# Patient Record
Sex: FEMALE | Race: WHITE | NOT HISPANIC OR LATINO | Employment: OTHER | RURAL
[De-identification: names, ages, dates, MRNs, and addresses within clinical notes are randomized per-mention and may not be internally consistent; named-entity substitution may affect disease eponyms.]

---

## 2020-07-24 ENCOUNTER — HISTORICAL (OUTPATIENT)
Dept: ADMINISTRATIVE | Facility: HOSPITAL | Age: 45
End: 2020-07-24

## 2020-07-24 LAB — SARS-COV+SARS-COV-2 AG RESP QL IA.RAPID: NEGATIVE

## 2022-09-08 ENCOUNTER — OFFICE VISIT (OUTPATIENT)
Dept: NEUROLOGY | Facility: CLINIC | Age: 47
End: 2022-09-08
Payer: MEDICARE

## 2022-09-08 VITALS
OXYGEN SATURATION: 99 % | HEART RATE: 69 BPM | HEIGHT: 60 IN | SYSTOLIC BLOOD PRESSURE: 134 MMHG | BODY MASS INDEX: 23.66 KG/M2 | WEIGHT: 120.5 LBS | DIASTOLIC BLOOD PRESSURE: 78 MMHG

## 2022-09-08 DIAGNOSIS — G43.909 MIGRAINE WITHOUT STATUS MIGRAINOSUS, NOT INTRACTABLE, UNSPECIFIED MIGRAINE TYPE: Primary | ICD-10-CM

## 2022-09-08 DIAGNOSIS — F43.21 GRIEF AT LOSS OF CHILD: ICD-10-CM

## 2022-09-08 DIAGNOSIS — Z63.4 GRIEF AT LOSS OF CHILD: ICD-10-CM

## 2022-09-08 PROCEDURE — 99204 PR OFFICE/OUTPT VISIT, NEW, LEVL IV, 45-59 MIN: ICD-10-PCS | Mod: S$PBB,,, | Performed by: PSYCHIATRY & NEUROLOGY

## 2022-09-08 PROCEDURE — 99204 OFFICE O/P NEW MOD 45 MIN: CPT | Mod: S$PBB,,, | Performed by: PSYCHIATRY & NEUROLOGY

## 2022-09-08 PROCEDURE — 99214 OFFICE O/P EST MOD 30 MIN: CPT | Mod: PBBFAC | Performed by: PSYCHIATRY & NEUROLOGY

## 2022-09-08 RX ORDER — TIZANIDINE 4 MG/1
4 TABLET ORAL EVERY 6 HOURS PRN
COMMUNITY

## 2022-09-08 RX ORDER — AMITRIPTYLINE HYDROCHLORIDE 25 MG/1
TABLET, FILM COATED ORAL
Qty: 30 TABLET | Refills: 11 | Status: SHIPPED | OUTPATIENT
Start: 2022-09-08 | End: 2023-06-08 | Stop reason: SDUPTHER

## 2022-09-08 RX ORDER — LEVOTHYROXINE SODIUM 88 UG/1
88 TABLET ORAL
COMMUNITY

## 2022-09-08 RX ORDER — TOPIRAMATE 100 MG/1
100 TABLET, FILM COATED ORAL 2 TIMES DAILY
COMMUNITY
End: 2023-06-08 | Stop reason: SDUPTHER

## 2022-09-08 RX ORDER — LEVOTHYROXINE SODIUM 100 UG/1
100 TABLET ORAL
COMMUNITY

## 2022-09-08 SDOH — SOCIAL DETERMINANTS OF HEALTH (SDOH): DISSAPEARANCE AND DEATH OF FAMILY MEMBER: Z63.4

## 2022-09-08 NOTE — PROGRESS NOTES
Subjective:       Patient ID: Jillian Sarmiento is a 47 y.o. female     Chief Complaint:    Chief Complaint   Patient presents with    Headache    Peripheral Neuropathy        Allergies:  Cephalosporins, Penicillins, and Sulfa (sulfonamide antibiotics)    Current Medications:    Outpatient Encounter Medications as of 9/8/2022   Medication Sig Dispense Refill    denosumab (PROLIA SUBQ) Inject into the skin every 6 (six) months.      levothyroxine (SYNTHROID) 100 MCG tablet Take 100 mcg by mouth before breakfast. Every other morning      levothyroxine (SYNTHROID) 88 MCG tablet Take 88 mcg by mouth before breakfast. Every other morning      tiZANidine (ZANAFLEX) 4 MG tablet Take 4 mg by mouth every 6 (six) hours as needed.      topiramate (TOPAMAX) 100 MG tablet Take 100 mg by mouth 2 (two) times daily.       No facility-administered encounter medications on file as of 9/8/2022.       History of Present Illness  46 yo WF w/ quarter century hx of migraine HEADACHE's beginning after birth of son-   Freq roughly twice daily - TOPAMAX 100 mg bid has reduced the prev freq and intensity   Poor trigger is sleep deprivation for which she has suffered insomnia her whole life even as child - prev on Elavil 25 mg qhs w/ good results but some irritability  Now taking THC gummies   Pt also lost daughter 18 months ago and s/p chemo from breast therapy and arthritis          Past Medical History:   Diagnosis Date    Fibromyalgia     Headache     Neuropathy        Past Surgical History:   Procedure Laterality Date    BACK SURGERY  2009    HYSTERECTOMY      MASTECTOMY Bilateral 09/18/2018       Social History  Ms. Sarmiento  reports that she has never smoked. She has been exposed to tobacco smoke. She has never used smokeless tobacco. She reports current alcohol use.    Family History  Ms.'s Sarmiento family history is not on file.    Review of Systems  Review of Systems   Neurological:  Positive for headaches.   Psychiatric/Behavioral:   Positive for depression. The patient is nervous/anxious and has insomnia.    All other systems reviewed and are negative.   Objective:   /78 (BP Location: Left arm, Patient Position: Sitting, BP Method: Large (Manual))   Pulse 69   Ht 5' (1.524 m)   Wt 54.7 kg (120 lb 8 oz)   SpO2 99%   BMI 23.53 kg/m²    NEUROLOGICAL EXAMINATION:     MENTAL STATUS   Oriented to person, place, and time.   Attention: normal. Concentration: normal.   Speech: speech is normal   Level of consciousness: alert  Knowledge: good.     CRANIAL NERVES   Cranial nerves II through XII intact.     MOTOR EXAM   Muscle bulk: normal  Overall muscle tone: normal    Strength   Strength 5/5 throughout.     GAIT AND COORDINATION     Gait  Gait: normal     Physical Exam  Vitals reviewed.   Neurological:      General: No focal deficit present.      Mental Status: She is alert and oriented to person, place, and time. Mental status is at baseline.      Cranial Nerves: Cranial nerves 2-12 are intact.      Motor: Motor strength is normal.      Gait: Gait is intact.   Psychiatric:         Speech: Speech normal.        Assessment:     Migraine without status migrainosus, not intractable, unspecified migraine type    Grief at loss of child       Primary Diagnosis and ICD10  Migraine without status migrainosus, not intractable, unspecified migraine type [G43.909]    Plan:     Patient Instructions   Restart Elavil 25-50 mg bedtime   Cont TOPAMAX 100mg twice daily  Improve sleep hygiene   Avoid any triggers   Refer Psych for grief / bereavement  F/u 3 months    There are no discontinued medications.    Requested Prescriptions      No prescriptions requested or ordered in this encounter

## 2022-09-08 NOTE — PATIENT INSTRUCTIONS
Restart Elavil 25-50 mg bedtime   Cont TOPAMAX 100mg twice daily  Improve sleep hygiene   Avoid any triggers   Refer Psych for grief / bereavement  F/u 3 months

## 2022-12-08 ENCOUNTER — OFFICE VISIT (OUTPATIENT)
Dept: NEUROLOGY | Facility: CLINIC | Age: 47
End: 2022-12-08
Payer: MEDICARE

## 2022-12-08 VITALS
HEIGHT: 60 IN | WEIGHT: 119.81 LBS | BODY MASS INDEX: 23.52 KG/M2 | OXYGEN SATURATION: 99 % | SYSTOLIC BLOOD PRESSURE: 118 MMHG | HEART RATE: 95 BPM | DIASTOLIC BLOOD PRESSURE: 70 MMHG

## 2022-12-08 DIAGNOSIS — G43.909 MIGRAINE WITHOUT STATUS MIGRAINOSUS, NOT INTRACTABLE, UNSPECIFIED MIGRAINE TYPE: Primary | ICD-10-CM

## 2022-12-08 PROCEDURE — 99214 OFFICE O/P EST MOD 30 MIN: CPT | Mod: PBBFAC | Performed by: PSYCHIATRY & NEUROLOGY

## 2022-12-08 PROCEDURE — 99214 PR OFFICE/OUTPT VISIT, EST, LEVL IV, 30-39 MIN: ICD-10-PCS | Mod: S$PBB,,, | Performed by: PSYCHIATRY & NEUROLOGY

## 2022-12-08 PROCEDURE — 99214 OFFICE O/P EST MOD 30 MIN: CPT | Mod: S$PBB,,, | Performed by: PSYCHIATRY & NEUROLOGY

## 2022-12-08 NOTE — PATIENT INSTRUCTIONS
Cont TOPAMAX 200mg bid  Cont Elavil 25 mg bedtime   Cont good sleep hygiene habits   Cont good hydration   Avoid any triggers   F/u 6 mon ths

## 2022-12-08 NOTE — PROGRESS NOTES
Subjective:       Patient ID: Jillian Sarmiento is a 47 y.o. female     Chief Complaint:    Chief Complaint   Patient presents with    Follow-up     3m        Allergies:  Cephalosporins, Penicillins, and Sulfa (sulfonamide antibiotics)    Current Medications:    Outpatient Encounter Medications as of 12/8/2022   Medication Sig Dispense Refill    amitriptyline (ELAVIL) 25 MG tablet Take one or two tabs po 1-2 hours prior to bedtime 30 tablet 11    denosumab (PROLIA SUBQ) Inject into the skin every 6 (six) months.      levothyroxine (SYNTHROID) 100 MCG tablet Take 100 mcg by mouth before breakfast. Every other morning      levothyroxine (SYNTHROID) 88 MCG tablet Take 88 mcg by mouth before breakfast. Every other morning      tiZANidine (ZANAFLEX) 4 MG tablet Take 4 mg by mouth every 6 (six) hours as needed.      topiramate (TOPAMAX) 100 MG tablet Take 100 mg by mouth 2 (two) times daily.       No facility-administered encounter medications on file as of 12/8/2022.       History of Present Illness  48 yo WF w/ chronic hx of severe migraines that have reduced in freq and intensity on TOPAMAX 200 mg bid and Elavil 25 mg qhs   She also takes THC gummies for sleep which is helping mod well   She is s/p breast cancer and loss of child causing tremendous stress exacerbating the above       Past Medical History:   Diagnosis Date    Fibromyalgia     Headache     Neuropathy        Past Surgical History:   Procedure Laterality Date    BACK SURGERY  2009    HYSTERECTOMY      MASTECTOMY Bilateral 09/18/2018       Social History  Ms. Sarmiento  reports that she has never smoked. She has been exposed to tobacco smoke. She has never used smokeless tobacco. She reports current alcohol use.    Family History  Ms.'s Sarmiento family history is not on file.    Review of Systems  Review of Systems   Neurological:  Positive for headaches.   All other systems reviewed and are negative.   Objective:   /70 (BP Location: Left arm, Patient  Position: Sitting, BP Method: Medium (Manual))   Pulse 95   Ht 5' (1.524 m)   Wt 54.3 kg (119 lb 12.8 oz)   SpO2 99%   BMI 23.40 kg/m²    NEUROLOGICAL EXAMINATION:     MENTAL STATUS   Oriented to person, place, and time.   Level of consciousness: alert  Knowledge: good.     CRANIAL NERVES   Cranial nerves II through XII intact.     MOTOR EXAM     Strength   Strength 5/5 throughout.     GAIT AND COORDINATION     Gait  Gait: normal     Physical Exam  Vitals reviewed.   Constitutional:       Appearance: She is normal weight.   Neurological:      General: No focal deficit present.      Mental Status: She is alert and oriented to person, place, and time. Mental status is at baseline.      Cranial Nerves: Cranial nerves 2-12 are intact.      Motor: Motor strength is normal.      Gait: Gait is intact.        Assessment:     Migraine without status migrainosus, not intractable, unspecified migraine type         Primary Diagnosis and ICD10  Migraine without status migrainosus, not intractable, unspecified migraine type [G43.909]    Plan:     There are no Patient Instructions on file for this visit.    There are no discontinued medications.    Requested Prescriptions      No prescriptions requested or ordered in this encounter

## 2023-06-08 ENCOUNTER — OFFICE VISIT (OUTPATIENT)
Dept: NEUROLOGY | Facility: CLINIC | Age: 48
End: 2023-06-08
Payer: MEDICARE

## 2023-06-08 VITALS
HEIGHT: 60 IN | DIASTOLIC BLOOD PRESSURE: 78 MMHG | SYSTOLIC BLOOD PRESSURE: 106 MMHG | BODY MASS INDEX: 23.39 KG/M2 | WEIGHT: 119.13 LBS | RESPIRATION RATE: 18 BRPM | HEART RATE: 71 BPM | OXYGEN SATURATION: 100 %

## 2023-06-08 DIAGNOSIS — G43.909 MIGRAINE WITHOUT STATUS MIGRAINOSUS, NOT INTRACTABLE, UNSPECIFIED MIGRAINE TYPE: Primary | ICD-10-CM

## 2023-06-08 PROCEDURE — 99214 PR OFFICE/OUTPT VISIT, EST, LEVL IV, 30-39 MIN: ICD-10-PCS | Mod: S$PBB,,, | Performed by: PSYCHIATRY & NEUROLOGY

## 2023-06-08 PROCEDURE — 99214 OFFICE O/P EST MOD 30 MIN: CPT | Mod: S$PBB,,, | Performed by: PSYCHIATRY & NEUROLOGY

## 2023-06-08 PROCEDURE — 99214 OFFICE O/P EST MOD 30 MIN: CPT | Mod: PBBFAC | Performed by: PSYCHIATRY & NEUROLOGY

## 2023-06-08 RX ORDER — METHYLPREDNISOLONE 4 MG/1
4 TABLET ORAL DAILY
COMMUNITY
Start: 2023-03-10

## 2023-06-08 RX ORDER — TOPIRAMATE 100 MG/1
100 TABLET, FILM COATED ORAL 2 TIMES DAILY
Qty: 180 TABLET | Refills: 3 | Status: SHIPPED | OUTPATIENT
Start: 2023-06-08 | End: 2023-12-11 | Stop reason: SDUPTHER

## 2023-06-08 RX ORDER — AMITRIPTYLINE HYDROCHLORIDE 25 MG/1
TABLET, FILM COATED ORAL
Qty: 90 TABLET | Refills: 3 | Status: SHIPPED | OUTPATIENT
Start: 2023-06-08 | End: 2023-12-11 | Stop reason: SDUPTHER

## 2023-06-08 NOTE — PROGRESS NOTES
Subjective:       Patient ID: Jillian Sarmiento is a 47 y.o. female     Chief Complaint:    Chief Complaint   Patient presents with    Follow-up     Migraines        Allergies:  Morphine, Sulfa (sulfonamide antibiotics), Hydrocodone-acetaminophen, Cephalosporins, Loratadine, and Penicillins    Current Medications:    Outpatient Encounter Medications as of 6/8/2023   Medication Sig Dispense Refill    amitriptyline (ELAVIL) 25 MG tablet Take one or two tabs po 1-2 hours prior to bedtime 30 tablet 11    denosumab (PROLIA SUBQ) Inject into the skin every 6 (six) months.      levothyroxine (SYNTHROID) 100 MCG tablet Take 100 mcg by mouth before breakfast. Every other morning      levothyroxine (SYNTHROID) 88 MCG tablet Take 88 mcg by mouth before breakfast. Every other morning      methylPREDNISolone (MEDROL DOSEPACK) 4 mg tablet Take 4 mg by mouth once daily. use as directed      tiZANidine (ZANAFLEX) 4 MG tablet Take 4 mg by mouth every 6 (six) hours as needed.      topiramate (TOPAMAX) 100 MG tablet Take 100 mg by mouth 2 (two) times daily.       No facility-administered encounter medications on file as of 6/8/2023.       History of Present Illness  48 yo WF who looks much younger than her age w/ hx HEADACHE's that have been well controlled w/ TOPAMAX bid and Elavil - also taking THC edibles to help sleep   Pt quite pleased w/ results of HEADACHE and migraines reduction        Past Medical History:   Diagnosis Date    Fibromyalgia     Headache     Neuropathy        Past Surgical History:   Procedure Laterality Date    BACK SURGERY  2009    HYSTERECTOMY      MASTECTOMY Bilateral 09/18/2018       Social History  Ms. Sarmiento  reports that she has never smoked. She has been exposed to tobacco smoke. She has never used smokeless tobacco. She reports current alcohol use.    Family History  Ms.'s Sarmiento family history is not on file.    Review of Systems  Review of Systems   Neurological:  Positive for headaches.   All other  systems reviewed and are negative.   Objective:   /78 (BP Location: Left arm, Patient Position: Sitting, BP Method: Large (Manual))   Pulse 71   Resp 18   Ht 5' (1.524 m)   Wt 54 kg (119 lb 1.9 oz)   SpO2 100%   BMI 23.26 kg/m²    NEUROLOGICAL EXAMINATION:     MENTAL STATUS   Oriented to person, place, and time.   Level of consciousness: alert  Knowledge: good.     CRANIAL NERVES   Cranial nerves II through XII intact.     MOTOR EXAM     Strength   Strength 5/5 throughout.     GAIT AND COORDINATION     Gait  Gait: normal     Physical Exam  Vitals reviewed.   Constitutional:       Appearance: She is normal weight.   Neurological:      General: No focal deficit present.      Mental Status: She is alert and oriented to person, place, and time. Mental status is at baseline.      Cranial Nerves: Cranial nerves 2-12 are intact.      Motor: Motor strength is normal.      Gait: Gait is intact.        Assessment:     There are no diagnoses linked to this encounter.     Primary Diagnosis and ICD10  No primary diagnosis found.    Plan:     There are no Patient Instructions on file for this visit.    There are no discontinued medications.    Requested Prescriptions      No prescriptions requested or ordered in this encounter

## 2023-12-11 ENCOUNTER — OFFICE VISIT (OUTPATIENT)
Dept: NEUROLOGY | Facility: CLINIC | Age: 48
End: 2023-12-11
Payer: MEDICARE

## 2023-12-11 VITALS
HEART RATE: 94 BPM | HEIGHT: 60 IN | BODY MASS INDEX: 19.83 KG/M2 | WEIGHT: 101 LBS | SYSTOLIC BLOOD PRESSURE: 116 MMHG | OXYGEN SATURATION: 99 % | DIASTOLIC BLOOD PRESSURE: 90 MMHG

## 2023-12-11 DIAGNOSIS — G43.711 CHRONIC MIGRAINE WITHOUT AURA, WITH INTRACTABLE MIGRAINE, SO STATED, WITH STATUS MIGRAINOSUS: Primary | ICD-10-CM

## 2023-12-11 DIAGNOSIS — G43.909 MIGRAINE WITHOUT STATUS MIGRAINOSUS, NOT INTRACTABLE, UNSPECIFIED MIGRAINE TYPE: ICD-10-CM

## 2023-12-11 PROCEDURE — 99214 PR OFFICE/OUTPT VISIT, EST, LEVL IV, 30-39 MIN: ICD-10-PCS | Mod: S$PBB,,, | Performed by: PSYCHIATRY & NEUROLOGY

## 2023-12-11 PROCEDURE — 99214 OFFICE O/P EST MOD 30 MIN: CPT | Mod: PBBFAC | Performed by: PSYCHIATRY & NEUROLOGY

## 2023-12-11 PROCEDURE — 99214 OFFICE O/P EST MOD 30 MIN: CPT | Mod: S$PBB,,, | Performed by: PSYCHIATRY & NEUROLOGY

## 2023-12-11 RX ORDER — AMITRIPTYLINE HYDROCHLORIDE 25 MG/1
TABLET, FILM COATED ORAL
Qty: 180 TABLET | Refills: 3 | Status: SHIPPED | OUTPATIENT
Start: 2023-12-11

## 2023-12-11 RX ORDER — LEVOTHYROXINE, LIOTHYRONINE 38; 9 UG/1; UG/1
60 TABLET ORAL
COMMUNITY
Start: 2023-11-10

## 2023-12-11 RX ORDER — TOPIRAMATE 100 MG/1
100 TABLET, FILM COATED ORAL 2 TIMES DAILY
Qty: 180 TABLET | Refills: 3 | Status: SHIPPED | OUTPATIENT
Start: 2023-12-11

## 2023-12-11 NOTE — PROGRESS NOTES
Subjective:       Patient ID: Jillian Sarmiento is a 48 y.o. female     Chief Complaint:    Chief Complaint   Patient presents with    Migraine        Allergies:  Morphine, Sulfa (sulfonamide antibiotics), Hydrocodone-acetaminophen, Cephalosporins, Loratadine, and Penicillins    Current Medications:    Outpatient Encounter Medications as of 12/11/2023   Medication Sig Dispense Refill    amitriptyline (ELAVIL) 25 MG tablet Take one or two tabs po 1-2 hours prior to bedtime 90 tablet 3    NP THYROID 60 mg Tab Take 60 mg by mouth before breakfast.      tiZANidine (ZANAFLEX) 4 MG tablet Take 4 mg by mouth every 6 (six) hours as needed.      topiramate (TOPAMAX) 100 MG tablet Take 1 tablet (100 mg total) by mouth 2 (two) times daily. 180 tablet 3    denosumab (PROLIA SUBQ) Inject into the skin every 6 (six) months.      levothyroxine (SYNTHROID) 100 MCG tablet Take 100 mcg by mouth before breakfast. Every other morning      levothyroxine (SYNTHROID) 88 MCG tablet Take 88 mcg by mouth before breakfast. Every other morning      methylPREDNISolone (MEDROL DOSEPACK) 4 mg tablet Take 4 mg by mouth once daily. use as directed       No facility-administered encounter medications on file as of 12/11/2023.       History of Present Illness  Very pleasant 48-year-old lady who presents in clinic for follow-up evaluation of her chronic migraines that have been greatly reduced in frequency and intensity with Topamax 100 mg b.i.d. and Elavil 25-50 mg p.o. q.h.s.  The latter has helped improve her sleep hygiene and stress reduction          Past Medical History:   Diagnosis Date    Fibromyalgia     Headache     Neuropathy        Past Surgical History:   Procedure Laterality Date    BACK SURGERY  2009    HYSTERECTOMY      MASTECTOMY Bilateral 09/18/2018       Social History  Ms. Sarmiento  reports that she has never smoked. She has been exposed to tobacco smoke. She has never used smokeless tobacco. She reports current alcohol  use.    Family History  Ms.'s Sarmiento family history is not on file.    Review of Systems  Review of Systems   Neurological:  Positive for headaches.   All other systems reviewed and are negative.     Objective:   BP (!) 116/90 (BP Location: Right arm, Patient Position: Sitting, BP Method: Large (Manual))   Pulse 94   Ht 5' (1.524 m)   Wt 45.8 kg (101 lb)   SpO2 99%   BMI 19.73 kg/m²    NEUROLOGICAL EXAMINATION:     MENTAL STATUS   Oriented to person, place, and time.   Level of consciousness: alert  Knowledge: good.     CRANIAL NERVES   Cranial nerves II through XII intact.     MOTOR EXAM     Strength   Strength 5/5 throughout.     GAIT AND COORDINATION     Gait  Gait: normal       Physical Exam  Vitals reviewed.   Constitutional:       Appearance: She is normal weight.   Neurological:      General: No focal deficit present.      Mental Status: She is alert and oriented to person, place, and time. Mental status is at baseline.      Cranial Nerves: Cranial nerves 2-12 are intact.      Motor: Motor strength is normal.     Gait: Gait is intact.          Assessment:     Chronic migraine without aura, with intractable migraine, so stated, with status migrainosus         Primary Diagnosis and ICD10  Chronic migraine without aura, with intractable migraine, so stated, with status migrainosus [G43.711]    Plan:     Patient Instructions   Continue Topamax and Elavil as tolerated   Stress reduction   Continue good sleep habits and healthy lifestyle   Follow-up six-months    There are no discontinued medications.    Requested Prescriptions      No prescriptions requested or ordered in this encounter

## 2023-12-11 NOTE — PATIENT INSTRUCTIONS
Continue Topamax and Elavil as tolerated   Stress reduction   Continue good sleep habits and healthy lifestyle   Follow-up six-months

## 2024-06-11 ENCOUNTER — OFFICE VISIT (OUTPATIENT)
Dept: NEUROLOGY | Facility: CLINIC | Age: 49
End: 2024-06-11
Payer: MEDICARE

## 2024-06-11 VITALS
DIASTOLIC BLOOD PRESSURE: 78 MMHG | HEIGHT: 60 IN | BODY MASS INDEX: 20.03 KG/M2 | RESPIRATION RATE: 18 BRPM | OXYGEN SATURATION: 96 % | HEART RATE: 76 BPM | WEIGHT: 102 LBS | SYSTOLIC BLOOD PRESSURE: 124 MMHG

## 2024-06-11 DIAGNOSIS — G43.909 MIGRAINE WITHOUT STATUS MIGRAINOSUS, NOT INTRACTABLE, UNSPECIFIED MIGRAINE TYPE: Primary | ICD-10-CM

## 2024-06-11 PROCEDURE — 99214 OFFICE O/P EST MOD 30 MIN: CPT | Mod: PBBFAC | Performed by: PSYCHIATRY & NEUROLOGY

## 2024-06-11 PROCEDURE — 99214 OFFICE O/P EST MOD 30 MIN: CPT | Mod: S$PBB,,, | Performed by: PSYCHIATRY & NEUROLOGY

## 2024-06-11 RX ORDER — LEVOTHYROXINE SODIUM 88 UG/1
88 TABLET ORAL
COMMUNITY

## 2024-06-11 RX ORDER — AMITRIPTYLINE HYDROCHLORIDE 50 MG/1
TABLET, FILM COATED ORAL
Qty: 90 TABLET | Refills: 3 | Status: SHIPPED | OUTPATIENT
Start: 2024-06-11
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
## 2024-07-09 ENCOUNTER — OFFICE VISIT (OUTPATIENT)
Dept: NEUROLOGY | Facility: CLINIC | Age: 49
End: 2024-07-09
Payer: MEDICARE

## 2024-07-09 VITALS
RESPIRATION RATE: 18 BRPM | HEART RATE: 113 BPM | HEIGHT: 60 IN | BODY MASS INDEX: 19.24 KG/M2 | SYSTOLIC BLOOD PRESSURE: 130 MMHG | DIASTOLIC BLOOD PRESSURE: 78 MMHG | WEIGHT: 98 LBS | OXYGEN SATURATION: 100 %

## 2024-07-09 DIAGNOSIS — G43.909 MIGRAINE WITHOUT STATUS MIGRAINOSUS, NOT INTRACTABLE, UNSPECIFIED MIGRAINE TYPE: Primary | ICD-10-CM

## 2024-07-09 PROCEDURE — 99999 PR PBB SHADOW E&M-EST. PATIENT-LVL IV: CPT | Mod: PBBFAC,,, | Performed by: PSYCHIATRY & NEUROLOGY

## 2024-07-09 PROCEDURE — 99214 OFFICE O/P EST MOD 30 MIN: CPT | Mod: S$PBB,,, | Performed by: PSYCHIATRY & NEUROLOGY

## 2024-07-09 PROCEDURE — 99214 OFFICE O/P EST MOD 30 MIN: CPT | Mod: PBBFAC | Performed by: PSYCHIATRY & NEUROLOGY

## 2024-07-09 RX ORDER — MIRTAZAPINE 15 MG/1
15 TABLET, FILM COATED ORAL NIGHTLY
Qty: 90 TABLET | Refills: 1 | Status: SHIPPED | OUTPATIENT
Start: 2024-07-09
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
## 2024-07-09 NOTE — PATIENT INSTRUCTIONS
Taper off Elavil   Cont Topamax as working well for migriane   Cont Psych evals regularly  Needs sleep aid but unable to take Lunesta or Ambien   Trial of melatonin or CBD  F/u 4 [facility]

## 2024-11-11 ENCOUNTER — OFFICE VISIT (OUTPATIENT)
Dept: NEUROLOGY | Facility: CLINIC | Age: 49
End: 2024-11-11
Payer: MEDICARE

## 2024-11-11 VITALS
HEIGHT: 61 IN | SYSTOLIC BLOOD PRESSURE: 133 MMHG | BODY MASS INDEX: 19.83 KG/M2 | OXYGEN SATURATION: 100 % | DIASTOLIC BLOOD PRESSURE: 83 MMHG | WEIGHT: 105 LBS | HEART RATE: 69 BPM

## 2024-11-11 DIAGNOSIS — G43.009 MIGRAINE WITHOUT AURA AND WITHOUT STATUS MIGRAINOSUS, NOT INTRACTABLE: Primary | ICD-10-CM

## 2024-11-11 PROCEDURE — 99214 OFFICE O/P EST MOD 30 MIN: CPT | Mod: S$PBB,,, | Performed by: PSYCHIATRY & NEUROLOGY

## 2024-11-11 PROCEDURE — 99999 PR PBB SHADOW E&M-EST. PATIENT-LVL IV: CPT | Mod: PBBFAC,,, | Performed by: PSYCHIATRY & NEUROLOGY

## 2024-11-11 PROCEDURE — 99214 OFFICE O/P EST MOD 30 MIN: CPT | Mod: PBBFAC | Performed by: PSYCHIATRY & NEUROLOGY

## 2024-11-11 RX ORDER — DULOXETIN HYDROCHLORIDE 60 MG/1
60 CAPSULE, DELAYED RELEASE ORAL DAILY
COMMUNITY
Start: 2024-07-20
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
## 2025-01-24 ENCOUNTER — OFFICE VISIT (OUTPATIENT)
Dept: NEUROLOGY | Facility: CLINIC | Age: 50
End: 2025-01-24
Payer: MEDICARE

## 2025-01-24 VITALS
OXYGEN SATURATION: 100 % | HEART RATE: 77 BPM | SYSTOLIC BLOOD PRESSURE: 126 MMHG | DIASTOLIC BLOOD PRESSURE: 87 MMHG | HEIGHT: 61 IN | RESPIRATION RATE: 18 BRPM | WEIGHT: 102 LBS | BODY MASS INDEX: 19.26 KG/M2

## 2025-01-24 DIAGNOSIS — G43.009 MIGRAINE WITHOUT AURA AND WITHOUT STATUS MIGRAINOSUS, NOT INTRACTABLE: Primary | ICD-10-CM

## 2025-01-24 PROCEDURE — 99214 OFFICE O/P EST MOD 30 MIN: CPT | Mod: S$PBB,,, | Performed by: PSYCHIATRY & NEUROLOGY

## 2025-01-24 PROCEDURE — 99215 OFFICE O/P EST HI 40 MIN: CPT | Mod: PBBFAC | Performed by: PSYCHIATRY & NEUROLOGY

## 2025-01-24 PROCEDURE — 99999 PR PBB SHADOW E&M-EST. PATIENT-LVL V: CPT | Mod: PBBFAC,,, | Performed by: PSYCHIATRY & NEUROLOGY

## 2025-01-24 RX ORDER — LEVOTHYROXINE SODIUM 88 UG/1
CAPSULE ORAL
COMMUNITY

## 2025-01-24 RX ORDER — HYDROXYZINE PAMOATE 25 MG/1
CAPSULE ORAL
COMMUNITY
Start: 2024-11-13

## 2025-01-24 RX ORDER — ZOLEDRONIC ACID 5 MG/100ML
5 INJECTION, SOLUTION INTRAVENOUS
COMMUNITY

## 2025-01-24 RX ORDER — BENZONATATE 200 MG/1
200 CAPSULE ORAL
COMMUNITY
Start: 2024-12-18

## 2025-01-24 NOTE — PROGRESS NOTES
Subjective:       Patient ID: Jillian Sarmiento is a 49 y.o. female     Chief Complaint:  No chief complaint on file.       Allergies:  Morphine, Sulfa (sulfonamide antibiotics), Hydrocodone-acetaminophen, Cephalosporins, Loratadine, and Penicillins    Current Medications:    Outpatient Encounter Medications as of 1/24/2025   Medication Sig Dispense Refill    benzonatate (TESSALON) 200 MG capsule Take 200 mg by mouth.      DULoxetine (CYMBALTA) 60 MG capsule Take 60 mg by mouth once daily. (Patient taking differently: Take 60 mg by mouth once daily. 60 in a.m. and 30 at p.m)      hydrOXYzine pamoate (VISTARIL) 25 MG Cap TAKE 1 CAPSULE BY MOUTH IN THE EVENING AT 5 PM      levothyroxine (TIROSINT) 88 mcg Cap Take by mouth.      tiZANidine (ZANAFLEX) 4 MG tablet Take 4 mg by mouth every 6 (six) hours as needed.      topiramate (TOPAMAX) 100 MG tablet Take 1 tablet (100 mg total) by mouth 2 (two) times daily. 180 tablet 3    zoledronic acid-mannitol & water (RECLAST) 5 mg/100 mL PgBk Inject 5 mg into the vein.      amitriptyline (ELAVIL) 50 MG tablet Take one or two tabs po 1-2 hours prior to bedtime (Patient not taking: Reported on 1/24/2025) 90 tablet 3    denosumab (PROLIA SUBQ) Inject into the skin every 6 (six) months. (Patient not taking: Reported on 1/24/2025)      levothyroxine (SYNTHROID) 100 MCG tablet Take 100 mcg by mouth before breakfast. Every other morning (Patient not taking: Reported on 1/24/2025)      levothyroxine (SYNTHROID) 88 MCG tablet Take 88 mcg by mouth before breakfast. Every other morning (Patient not taking: Reported on 1/24/2025)      levothyroxine (SYNTHROID) 88 MCG tablet Take 88 mcg by mouth before breakfast. (Patient not taking: Reported on 1/24/2025)      methylPREDNISolone (MEDROL DOSEPACK) 4 mg tablet Take 4 mg by mouth once daily. use as directed (Patient not taking: Reported on 1/24/2025)      mirtazapine (REMERON) 15 MG tablet Take 1 tablet (15 mg total) by mouth every  "evening. (Patient not taking: Reported on 1/24/2025) 90 tablet 1    NP THYROID 60 mg Tab Take 60 mg by mouth before breakfast. (Patient not taking: Reported on 1/24/2025)       No facility-administered encounter medications on file as of 1/24/2025.       History of Present Illness  50 yo WF w/ migraines well controlled on current TOPAMAX 100mg bid   Today she is concerned if she has autism/ Asperger's/ seizure disorder ?  She does not fit any characteristic of above but is highly concerned about such   Feels she might be having "silent" seizures          Past Medical History:   Diagnosis Date    Fibromyalgia     Headache     Neuropathy        Past Surgical History:   Procedure Laterality Date    BACK SURGERY  2009    HYSTERECTOMY      MASTECTOMY Bilateral 09/18/2018       Social History  Ms. Sarmiento  reports that she has never smoked. She has been exposed to tobacco smoke. She has never used smokeless tobacco. She reports current alcohol use.    Family History  Ms.'s Sarmiento family history is not on file.    Review of Systems  Review of Systems   Neurological:  Positive for headaches.   Psychiatric/Behavioral:  Positive for depression. The patient is nervous/anxious.    All other systems reviewed and are negative.     Objective:   /87 (BP Location: Left arm, Patient Position: Sitting)   Pulse 77   Resp 18   Ht 5' 1" (1.549 m)   Wt 46.3 kg (102 lb)   SpO2 100%   BMI 19.27 kg/m²    NEUROLOGICAL EXAMINATION:     MENTAL STATUS   Oriented to person, place, and time.   Level of consciousness: alert  Knowledge: good.     CRANIAL NERVES   Cranial nerves II through XII intact.     MOTOR EXAM     Strength   Strength 5/5 throughout.     GAIT AND COORDINATION     Gait  Gait: normal       Physical Exam  Vitals reviewed.   Constitutional:       Appearance: She is normal weight.   Neurological:      General: No focal deficit present.      Mental Status: She is alert and oriented to person, place, and time. Mental status is at " baseline.      Cranial Nerves: Cranial nerves 2-12 are intact.      Motor: Motor strength is normal.     Gait: Gait is intact.          Assessment:     Migraine without aura and without status migrainosus, not intractable  -     EEG,extended monitoring,41-60 minutes; Future  -     MRI Brain W WO Contrast; Future; Expected date: 01/24/2025         Primary Diagnosis and ICD10  Migraine without aura and without status migrainosus, not intractable [G43.009]    Plan:     There are no Patient Instructions on file for this visit.    There are no discontinued medications.    Requested Prescriptions      No prescriptions requested or ordered in this encounter

## 2025-02-06 ENCOUNTER — PROCEDURE VISIT (OUTPATIENT)
Dept: NEUROLOGY | Facility: HOSPITAL | Age: 50
End: 2025-02-06
Attending: PSYCHIATRY & NEUROLOGY
Payer: MEDICARE

## 2025-02-06 ENCOUNTER — HOSPITAL ENCOUNTER (OUTPATIENT)
Dept: RADIOLOGY | Facility: HOSPITAL | Age: 50
Discharge: HOME | End: 2025-02-06
Attending: PSYCHIATRY & NEUROLOGY
Payer: MEDICARE

## 2025-02-06 DIAGNOSIS — G43.009 MIGRAINE WITHOUT AURA AND WITHOUT STATUS MIGRAINOSUS, NOT INTRACTABLE: ICD-10-CM

## 2025-02-06 PROCEDURE — 70553 MRI BRAIN STEM W/O & W/DYE: CPT | Mod: TC

## 2025-02-06 PROCEDURE — A9577 INJ MULTIHANCE: HCPCS | Performed by: PSYCHIATRY & NEUROLOGY

## 2025-02-06 PROCEDURE — 70553 MRI BRAIN STEM W/O & W/DYE: CPT | Mod: 26,,, | Performed by: RADIOLOGY

## 2025-02-06 PROCEDURE — 95812 EEG 41-60 MINUTES: CPT

## 2025-02-06 PROCEDURE — 25500020 PHARM REV CODE 255: Performed by: PSYCHIATRY & NEUROLOGY

## 2025-02-06 RX ADMIN — GADOBENATE DIMEGLUMINE 10 ML: 529 INJECTION, SOLUTION INTRAVENOUS at 10:02

## 2025-02-06 NOTE — PROCEDURES
ELECTROENCEPHALOGRAM REPORT    DATE OF SERVICE:   EEG NUMBER: [id number]  REQUESTED BY: Nic Pak MD/ALCON Treviño   LOCATION OF SERVICE: [facility]urology/Inpatient    METHODOLOGY   Electroencephalographic (EEG) recording is with electrodes placed according to the International 10-20 placement system.  Thirty two (32) channels of digital signal (sampling rate of 512/sec) including T1 and T2 was simultaneously recorded from the scalp and may include  EKG, EMG, and/or eye monitors.  Recording band pass was 0.1 to 512 hz.  Digital video recording of the patient is simultaneously recorded with the EEG.  The patient is instructed report clinical symptoms which may occur during the recording session.  EEG and video recording is stored and archived in digital format. Activation procedures which include photic stimulation, hyperventilation and instructing patients to perform simple task are done in selected patients.    The EEG is displayed on a monitor screen and can be reviewed using different montages.  Computer assisted analysis is employed to detect spike and electrographic seizure activity.   The entire record is submitted for computer analysis.  The entire recording is visually reviewed and the times identified by computer analysis as being spikes or seizures are reviewed again.  Compresses spectral analysis (CSA) is also performed on the activity recorded from each individual channel.  This is displayed as a power display of frequencies from 0 to 30 Hz over time.   The CSA is reviewed looking for asymmetries in power between homologous areas of the scalp and then compared with the original EEG recording.     [employer] software was also utilized in the review of this study.  This software suite analyzes the EEG recording in multiple domains.  Coherence and rhythmicity is computed to identify EEG sections which may contain organized seizures.  Each channel undergoes analysis to detect presence of spike  and sharp waves which have special and morphological characteristic of epileptic activity.  The routine EEG recording is converted from spacial into frequency domain.  This is then displayed comparing homologous areas to identify areas of significant asymmetry.  Algorithm to identify non-cortically generated artifact is used to separate eye movement, EMG and other artifact from the EEG    EEG FINDINGS  During the maximally alert state, a 11-12 Hz posterior dominant rhythm was seen which was symmetrical, well-regulated and briskly attenuated to eye opening. In the more anterior head regions, symmetric frontocentral beta frequencies predominated. As drowsiness occurred, the posterior dominant rhythm attenuated, slow rolling eye movements appeared, and symmetrical vertex sharp transients were seen. Stage N2 sleep was reached and was characterized proc by high amplitude K-complexes and 12-15 Hz symmetrical and synchronous frontocentral sleep spindles.  Excessive beta range activity was noted throughout the record.    No epileptiform findings were noted, no electrographic seizures were seen, and no clinical events were reported.      Activation Procedures   Hyperventilation - no change in cortical rhythms   Photic Stimulation     - occipital driving - present    - Pathological discharges produced - NO      IMPRESSION:  Normal awake and asleep  Excessive beta activity    CLINICAL CORRELATION:  This is a normal EEG in awake and asleep states.  There were no epileptiform findings, electrographic seizures, or no clinical events recorded. An EEG without epileptiform findings does not exclude the possibility of epilepsy. If the clinical suspicion of epilepsy is high, a repeat EEG after sleep depreivation, following a seizure, or a prolonged EEG may increase the frequency of detecting epileptiform discharges. The excessive beta is likely a medication side-effect, and can be seen with benzodiazepine and barbiturate  administration.      Jimmie Aguilar MD  Neurology

## 2025-02-28 ENCOUNTER — OFFICE VISIT (OUTPATIENT)
Dept: NEUROLOGY | Facility: CLINIC | Age: 50
End: 2025-02-28
Payer: MEDICARE

## 2025-02-28 VITALS
SYSTOLIC BLOOD PRESSURE: 102 MMHG | HEIGHT: 61 IN | RESPIRATION RATE: 18 BRPM | BODY MASS INDEX: 19.45 KG/M2 | DIASTOLIC BLOOD PRESSURE: 78 MMHG | WEIGHT: 103 LBS

## 2025-02-28 DIAGNOSIS — G43.009 MIGRAINE WITHOUT AURA AND WITHOUT STATUS MIGRAINOSUS, NOT INTRACTABLE: Primary | ICD-10-CM

## 2025-02-28 PROCEDURE — 99214 OFFICE O/P EST MOD 30 MIN: CPT | Mod: S$PBB,,, | Performed by: PSYCHIATRY & NEUROLOGY

## 2025-02-28 PROCEDURE — 99999 PR PBB SHADOW E&M-EST. PATIENT-LVL V: CPT | Mod: PBBFAC,,, | Performed by: PSYCHIATRY & NEUROLOGY

## 2025-02-28 PROCEDURE — 99215 OFFICE O/P EST HI 40 MIN: CPT | Mod: PBBFAC | Performed by: PSYCHIATRY & NEUROLOGY

## 2025-02-28 RX ORDER — TOPIRAMATE 100 MG/1
100 TABLET, FILM COATED ORAL 2 TIMES DAILY
Qty: 180 TABLET | Refills: 3 | Status: SHIPPED | OUTPATIENT
Start: 2025-02-28

## 2025-02-28 RX ORDER — POTASSIUM CHLORIDE 750 MG/1
CAPSULE, EXTENDED RELEASE ORAL
COMMUNITY
Start: 2025-02-03

## 2025-02-28 RX ORDER — ERGOCALCIFEROL 1.25 MG/1
CAPSULE ORAL
COMMUNITY
Start: 2025-02-03

## 2025-02-28 NOTE — PROGRESS NOTES
"    Subjective:       Patient ID: Jillian Sarmiento is a 49 y.o. female     Chief Complaint:    Chief Complaint   Patient presents with    Migraine     Pt. States dull headaches everyday.        Allergies:  Morphine, Sulfa (sulfonamide antibiotics), Sulfasalazine, Hydrocodone-acetaminophen, Cephalosporins, Hydrocortisone, Loratadine, and Penicillins    Current Medications:  Encounter Medications[1]    History of Present Illness  50 yo WF w/ well controlled HEADACHEs on TOPAMAX   Mri brain and EEG recently comp [facility]   Last week has dullHA pain poss secondary to her current perimenopausal condtions and vision issues        Past Medical History:   Diagnosis Date    Fibromyalgia     Headache     Neuropathy        Past Surgical History:   Procedure Laterality Date    BACK SURGERY  2009    HYSTERECTOMY      MASTECTOMY Bilateral 09/18/2018       Social History  Ms. Sarmiento  reports that she has never smoked. She has been exposed to tobacco smoke. She has never used smokeless tobacco. She reports current alcohol use.    Family History  Ms.'s Sarmiento family history is not on file.    Review of Systems  Review of Systems   Neurological:  Positive for headaches.   Psychiatric/Behavioral:  The patient is nervous/anxious.    All other systems reviewed and are negative.   Objective:   /78 (BP Location: Right arm, Patient Position: Sitting)   Resp 18   Ht 5' 1" (1.549 m)   Wt 46.7 kg (103 lb)   BMI 19.46 kg/m²    NEUROLOGICAL EXAMINATION:     MENTAL STATUS   Oriented to person, place, and time.   Level of consciousness: alert  Knowledge: good.     CRANIAL NERVES   Cranial nerves II through XII intact.     MOTOR EXAM     Strength   Strength 5/5 throughout.     GAIT AND COORDINATION     Gait  Gait: normal     Physical Exam  Vitals reviewed.   Constitutional:       Appearance: She is normal weight.   Neurological:      General: No focal deficit present.      Mental Status: She is alert and oriented to person, place, and time. " Mental status is at baseline.      Cranial Nerves: Cranial nerves 2-12 are intact.      Motor: Motor strength is normal.     Gait: Gait is intact.        Assessment:     Migraine without aura and without status migrainosus, not intractable         Primary Diagnosis and ICD10  Migraine without aura and without status migrainosus, not intractable [G43.009]    Plan:     There are no Patient Instructions on file for this visit.    There are no discontinued medications.    Requested Prescriptions      No prescriptions requested or ordered in this encounter              [1]  Outpatient Encounter Medications as of 2/28/2025   Medication Sig Dispense Refill    DULoxetine (CYMBALTA) 60 MG capsule Take 60 mg by mouth once daily.      ergocalciferol (ERGOCALCIFEROL) 50,000 unit Cap Once weekly      levothyroxine (TIROSINT) 88 mcg Cap Take by mouth.      potassium chloride (MICRO-K) 10 MEQ CpSR Take two capsules daily      tiZANidine (ZANAFLEX) 4 MG tablet Take 4 mg by mouth every 6 (six) hours as needed.      topiramate (TOPAMAX) 100 MG tablet Take 1 tablet (100 mg total) by mouth 2 (two) times daily. 180 tablet 3    zoledronic acid-mannitol & water (RECLAST) 5 mg/100 mL PgBk Inject 5 mg into the vein.      amitriptyline (ELAVIL) 50 MG tablet Take one or two tabs po 1-2 hours prior to bedtime (Patient not taking: Reported on 1/24/2025) 90 tablet 3    benzonatate (TESSALON) 200 MG capsule Take 200 mg by mouth.      denosumab (PROLIA SUBQ) Inject into the skin every 6 (six) months. (Patient not taking: Reported on 1/24/2025)      hydrOXYzine pamoate (VISTARIL) 25 MG Cap TAKE 1 CAPSULE BY MOUTH IN THE EVENING AT 5 PM (Patient not taking: Reported on 2/28/2025)      levothyroxine (SYNTHROID) 100 MCG tablet Take 100 mcg by mouth before breakfast. Every other morning (Patient not taking: Reported on 1/24/2025)      levothyroxine (SYNTHROID) 88 MCG tablet Take 88 mcg by mouth before breakfast. Every other morning  (Patient not taking: Reported on 1/24/2025)      levothyroxine (SYNTHROID) 88 MCG tablet Take 88 mcg by mouth before breakfast. (Patient not taking: Reported on 1/24/2025)      methylPREDNISolone (MEDROL DOSEPACK) 4 mg tablet Take 4 mg by mouth once daily. use as directed (Patient not taking: Reported on 1/24/2025)      mirtazapine (REMERON) 15 MG tablet Take 1 tablet (15 mg total) by mouth every evening. (Patient not taking: Reported on 1/24/2025) 90 tablet 1    NP THYROID 60 mg Tab Take 60 mg by mouth before breakfast. (Patient not taking: Reported on 1/24/2025)       No facility-administered encounter medications on file as of 2/28/2025.    Initial (On Arrival)

## 2025-08-28 ENCOUNTER — OFFICE VISIT (OUTPATIENT)
Dept: NEUROLOGY | Facility: CLINIC | Age: 50
End: 2025-08-28
Payer: MEDICARE

## 2025-08-28 VITALS
SYSTOLIC BLOOD PRESSURE: 100 MMHG | WEIGHT: 105 LBS | OXYGEN SATURATION: 100 % | HEART RATE: 67 BPM | HEIGHT: 61 IN | BODY MASS INDEX: 19.83 KG/M2 | RESPIRATION RATE: 18 BRPM | DIASTOLIC BLOOD PRESSURE: 66 MMHG

## 2025-08-28 DIAGNOSIS — G43.009 MIGRAINE WITHOUT AURA AND WITHOUT STATUS MIGRAINOSUS, NOT INTRACTABLE: Primary | ICD-10-CM

## 2025-08-28 PROCEDURE — 99215 OFFICE O/P EST HI 40 MIN: CPT | Mod: PBBFAC | Performed by: PSYCHIATRY & NEUROLOGY

## 2025-08-28 PROCEDURE — 99214 OFFICE O/P EST MOD 30 MIN: CPT | Mod: S$PBB,,, | Performed by: PSYCHIATRY & NEUROLOGY

## 2025-08-28 PROCEDURE — 99999 PR PBB SHADOW E&M-EST. PATIENT-LVL V: CPT | Mod: PBBFAC,,, | Performed by: PSYCHIATRY & NEUROLOGY

## 2025-08-28 RX ORDER — CHOLECALCIFEROL (VITAMIN D3) 25 MCG
1000 TABLET ORAL
COMMUNITY
Start: 2025-05-05

## 2025-08-28 RX ORDER — DICLOFENAC SODIUM 10 MG/G
2 GEL TOPICAL
COMMUNITY
Start: 2025-08-12

## 2025-08-28 RX ORDER — CYANOCOBALAMIN 1000 UG/ML
INJECTION, SOLUTION INTRAMUSCULAR; SUBCUTANEOUS
COMMUNITY
Start: 2025-08-05

## 2025-08-28 RX ORDER — TAMOXIFEN CITRATE 20 MG/1
TABLET ORAL
COMMUNITY